# Patient Record
Sex: FEMALE | Race: OTHER | NOT HISPANIC OR LATINO | URBAN - METROPOLITAN AREA
[De-identification: names, ages, dates, MRNs, and addresses within clinical notes are randomized per-mention and may not be internally consistent; named-entity substitution may affect disease eponyms.]

---

## 2025-05-14 ENCOUNTER — OFFICE VISIT (OUTPATIENT)
Dept: URGENT CARE | Age: 20
End: 2025-05-14
Payer: COMMERCIAL

## 2025-05-14 VITALS
HEART RATE: 84 BPM | TEMPERATURE: 98.4 F | RESPIRATION RATE: 15 BRPM | SYSTOLIC BLOOD PRESSURE: 127 MMHG | DIASTOLIC BLOOD PRESSURE: 75 MMHG | OXYGEN SATURATION: 98 %

## 2025-05-14 DIAGNOSIS — J03.90 TONSILLITIS: ICD-10-CM

## 2025-05-14 DIAGNOSIS — J02.9 SORE THROAT: Primary | ICD-10-CM

## 2025-05-14 LAB — POC RAPID STREP: NEGATIVE

## 2025-05-14 ASSESSMENT — ENCOUNTER SYMPTOMS: SORE THROAT: 1

## 2025-05-14 NOTE — PROGRESS NOTES
Subjective   Patient ID: Shanelle Rock is a 19 y.o. female. They present today with a chief complaint of Sore Throat (today).    History of Present Illness  Pt presents to the  with the c/o sore throat. Very painful to swallow. No difficulty swallowing. Hx of strep.  Has had mono in the past. No fever or other associated symptoms. No other concerns to address.       Sore Throat         Past Medical History  Allergies as of 05/14/2025    (No Known Allergies)       Prescriptions Prior to Admission[1]     Medical History[2]    Surgical History[3]         Review of Systems  Review of Systems   HENT:  Positive for sore throat.      10 point ROS completed and all are negative other than what is stated in the current HPI                               Objective    Vitals:    05/14/25 1722   BP: 127/75   Pulse: 84   Resp: 15   Temp: 36.9 °C (98.4 °F)   SpO2: 98%     No LMP recorded.    Physical Exam  Vitals and nursing note reviewed.   Constitutional:       Appearance: Normal appearance. She is not ill-appearing or toxic-appearing.      Comments: Pt appears uncomfortable secondary to pain with swallowing   HENT:      Head: Normocephalic and atraumatic.      Mouth/Throat:      Lips: Pink.      Mouth: Mucous membranes are moist.      Pharynx: Uvula midline. Postnasal drip present. No oropharyngeal exudate or posterior oropharyngeal erythema.      Tonsils: Tonsillar exudate present. No tonsillar abscesses. 1+ on the right. 1+ on the left.        Comments: No drooling  Positive exudate to bilateral tonsils  Cardiovascular:      Rate and Rhythm: Normal rate and regular rhythm.      Pulses: Normal pulses.      Heart sounds: Normal heart sounds.   Musculoskeletal:         General: Normal range of motion.      Cervical back: Neck supple. No rigidity or tenderness.   Lymphadenopathy:      Cervical: No cervical adenopathy.   Skin:     General: Skin is warm and dry.      Capillary Refill: Capillary refill takes less than 2 seconds.       Findings: No rash.   Neurological:      Mental Status: She is alert and oriented to person, place, and time.   Psychiatric:         Behavior: Behavior normal.         Procedures    Point of Care Test & Imaging Results from this visit  No results found for this visit on 05/14/25.   Imaging  No results found.    Cardiology, Vascular, and Other Imaging  No other imaging results found for the past 2 days      Diagnostic study results (if any) were reviewed by ESTHELA Costello.    Assessment/Plan   Allergies, medications, history, and pertinent labs/EKGs/Imaging reviewed by ESTHELA Costello.     Medical Decision Making  Tonsillitis/Sore Throat:  - Hx of mono; symptoms just started today  - No difficulty swallowing   - Rapid Strep negative; will send PCR and if positive will send in antibiotics; throw out toothbrush in the next 4 days to prevent re-infection   - Good oral hydration to prevent dehydration   - Warm salt gargles; Cepacol drops/spray OTC   - Advised on s/s to seek emergent care for   - Tylenol/Motrin as needed for pain or fever      Orders and Diagnoses  Diagnoses and all orders for this visit:  Sore throat  -     POCT rapid strep A manually resulted  -     Group A Streptococcus, PCR  Tonsillitis  -     Group A Streptococcus, PCR      Medical Admin Record      Patient disposition: Home    Electronically signed by ESTHELA Costello  6:01 PM           [1] (Not in a hospital admission)   [2]   Past Medical History:  Diagnosis Date    Other conditions influencing health status 02/24/2020    No significant past medical history   [3]   Past Surgical History:  Procedure Laterality Date    OTHER SURGICAL HISTORY  02/24/2020    No history of surgery

## 2025-05-14 NOTE — PATIENT INSTRUCTIONS
Tonsillitis/Sore Throat:  - Hx of mono; symptoms just started today  - No difficulty swallowing   - Rapid Strep negative; will send PCR and if positive will send in antibiotics; throw out toothbrush in the next 4 days to prevent re-infection   - Good oral hydration to prevent dehydration   - Warm salt gargles; Cepacol drops/spray OTC   - Advised on s/s to seek emergent care for   - Tylenol/Motrin as needed for pain or fever

## 2025-05-15 LAB — S PYO DNA THROAT QL NAA+PROBE: NOT DETECTED
